# Patient Record
Sex: MALE | Race: ASIAN | Employment: FULL TIME | URBAN - METROPOLITAN AREA
[De-identification: names, ages, dates, MRNs, and addresses within clinical notes are randomized per-mention and may not be internally consistent; named-entity substitution may affect disease eponyms.]

---

## 2020-11-10 ENCOUNTER — OFFICE VISIT (OUTPATIENT)
Dept: FAMILY MEDICINE | Facility: CLINIC | Age: 20
End: 2020-11-10
Payer: COMMERCIAL

## 2020-11-10 VITALS
TEMPERATURE: 97.9 F | RESPIRATION RATE: 16 BRPM | OXYGEN SATURATION: 100 % | HEART RATE: 69 BPM | BODY MASS INDEX: 19.01 KG/M2 | WEIGHT: 125 LBS

## 2020-11-10 DIAGNOSIS — Z20.822 SUSPECTED COVID-19 VIRUS INFECTION: Primary | ICD-10-CM

## 2020-11-10 DIAGNOSIS — G43.009 MIGRAINE WITHOUT AURA AND WITHOUT STATUS MIGRAINOSUS, NOT INTRACTABLE: ICD-10-CM

## 2020-11-10 LAB
COVID-19 VIRUS PCR TO U OF MN - SOURCE: NORMAL
SARS-COV-2 RNA SPEC QL NAA+PROBE: NOT DETECTED

## 2020-11-10 PROCEDURE — 87635 SARS-COV-2 COVID-19 AMP PRB: CPT | Performed by: STUDENT IN AN ORGANIZED HEALTH CARE EDUCATION/TRAINING PROGRAM

## 2020-11-10 PROCEDURE — 99202 OFFICE O/P NEW SF 15 MIN: CPT | Mod: GC | Performed by: STUDENT IN AN ORGANIZED HEALTH CARE EDUCATION/TRAINING PROGRAM

## 2020-11-10 NOTE — PROGRESS NOTES
Headache and COVID-testing         SUBJECTIVE       Isaac Vázquez is a 20-year-old male with past medical history significant for migraines who presents for COVID testing.     Headache and COVID testing:  - Developed symptoms eight days ago   - Right-sided headache with associated nausea   - Typical of migraines in past- has one every 1-2 months.   - Used Tylenol and ibuprofen to control pain. Has never tried Imitrex or migraine-specific medications   - Symptoms resolved five days ago   - Currently asymptomatic   - Denies fever, chills, sweats, runny nose, stuffy nose, cough - non-productive, cough - productive, wheezing, shortness of breath, ear pain, sore throat, hoarse voice, facial pain/pressure, headache, body aches, nausea, vomiting and diarrhea.   - Patient is not considered high risk based on medical history.   - Patient denies any travel, denies exposure to persons with known travel, and denies exposure to patients with known COVID19.   - Requires negative results to return to work.     Establish Care:  - Last seen in 2016 for [facility]   - PMH: Migraines as noted above.   - Medications: No daily medications.   - Social: Employed at [employer].         REVIEW OF SYSTEMS     A 10 point review of systems including constitutional, cardiovascular, respiratory, HEENT, GI, , neurological, MSK, skin, endocrine, is negative unless stated in HPI        OBJECTIVE     Vitals:    11/10/20 0837   Pulse: 69   Resp: 16   Temp: 97.9  F (36.6  C)   TempSrc: Tympanic   SpO2: 100%   Weight: 56.7 kg (125 lb)     Constitutional: Awake, alert, cooperative, no apparent distress, and appears stated age. Appears well hydrated  Eyes: Lids and lashes normal, sclera clear, conjunctiva normal.  ENT: Normocephalic, without obvious abnormality, atramatic  Lungs: No increased work of breathing on room air  Neurologic: Awake, alert, oriented to name, place and time.   Skin: No rash on exposed skin.     No results found for this or any previous  visit (from the past 24 hour(s)).    ASSESSMENT AND PLAN     Migraines: Patient has history of migraine therapy not on any medications. Headaches are occasional currently and only occur every 1-2 months, but can last for days. Discussed with patient that if headaches are becoming more frequent or significantly impacting his ability to function/work, he can return to discuss migraine medications. He is agreeable with the plan.     Suspected COVID-19 virus infection  Patient deemed to be safe to continue supportive cares at home and self-isolation. Patient must isolate until test results return.  Reviewed return precautions. Patient provided with the educational handout. Encouraged them to limit contact with others, wearing a simple mask to cover their cough, practice good hand hygiene habits and accessing our virtual services where possible to limit the spread of this virus.  -     COVID-19 Virus PCR F ([facility])    I discussed the patient with Estrellita Ponce MD who is in agreement with the assessment and plan.      Cris Barrett, MS3    Resident/Fellow Attestation   I, Suhail Velasquez, was present with the medical student who participated in the service and in the documentation of the note.  I have verified the history and personally performed the physical exam and medical decision making.  I agree with the assessment and plan of care as documented in the note.      Suhail Velasquez MD  PGY3    Precepted with Dr. Ponce

## 2020-11-11 PROBLEM — G43.009 MIGRAINE WITHOUT AURA AND WITHOUT STATUS MIGRAINOSUS, NOT INTRACTABLE: Status: ACTIVE | Noted: 2020-11-11

## 2020-11-11 RX ORDER — INFLUENZA A VIRUS A/GUANGDONG-MAONAN/SWL1536/2019 CNIC-1909 (H1N1) ANTIGEN (FORMALDEHYDE INACTIVATED), INFLUENZA A VIRUS A/HONG KONG/2671/2019 (H3N2) ANTIGEN (FORMALDEHYDE INACTIVATED), INFLUENZA B VIRUS B/PHUKET/3073/2013 ANTIGEN (FORMALDEHYDE INACTIVATED), AND INFLUENZA B VIRUS B/WASHINGTON/02/2019 ANTIGEN (FORMALDEHYDE INACTIVATED) 15; 15; 15; 15 UG/.5ML; UG/.5ML; UG/.5ML; UG/.5ML
INJECTION, SUSPENSION INTRAMUSCULAR
COMMUNITY
Start: 2020-10-16

## 2020-11-17 NOTE — PROGRESS NOTES
Preceptor Attestation:   Patient seen, evaluated and discussed with the resident. I have verified the content of the note, which accurately reflects my assessment of the patient and the plan of care.  Supervising Physician:Estrellita Ponce MD  [facility]

## 2025-06-11 ENCOUNTER — HOSPITAL ENCOUNTER (EMERGENCY)
Facility: HOSPITAL | Age: 25
Discharge: HOME | End: 2025-06-11
Attending: STUDENT IN AN ORGANIZED HEALTH CARE EDUCATION/TRAINING PROGRAM | Admitting: STUDENT IN AN ORGANIZED HEALTH CARE EDUCATION/TRAINING PROGRAM
Payer: COMMERCIAL

## 2025-06-11 VITALS
WEIGHT: 135.3 LBS | BODY MASS INDEX: 20.04 KG/M2 | RESPIRATION RATE: 20 BRPM | HEIGHT: 69 IN | SYSTOLIC BLOOD PRESSURE: 95 MMHG | OXYGEN SATURATION: 97 % | HEART RATE: 83 BPM | DIASTOLIC BLOOD PRESSURE: 55 MMHG | TEMPERATURE: 98.2 F

## 2025-06-11 DIAGNOSIS — S01.81XA CHIN LACERATION, INITIAL ENCOUNTER: ICD-10-CM

## 2025-06-11 PROCEDURE — 90471 IMMUNIZATION ADMIN: CPT | Performed by: STUDENT IN AN ORGANIZED HEALTH CARE EDUCATION/TRAINING PROGRAM

## 2025-06-11 PROCEDURE — 250N000011 HC RX IP 250 OP 636: Performed by: STUDENT IN AN ORGANIZED HEALTH CARE EDUCATION/TRAINING PROGRAM

## 2025-06-11 PROCEDURE — 12052 INTMD RPR FACE/MM 2.6-5.0 CM: CPT

## 2025-06-11 PROCEDURE — 90715 TDAP VACCINE 7 YRS/> IM: CPT | Performed by: STUDENT IN AN ORGANIZED HEALTH CARE EDUCATION/TRAINING PROGRAM

## 2025-06-11 PROCEDURE — 99283 EMERGENCY DEPT VISIT LOW MDM: CPT | Mod: 25

## 2025-06-11 PROCEDURE — 250N000013 HC RX MED GY IP 250 OP 250 PS 637: Performed by: STUDENT IN AN ORGANIZED HEALTH CARE EDUCATION/TRAINING PROGRAM

## 2025-06-11 RX ORDER — ACETAMINOPHEN 325 MG/1
975 TABLET ORAL ONCE
Status: COMPLETED | OUTPATIENT
Start: 2025-06-11 | End: 2025-06-11

## 2025-06-11 RX ORDER — IBUPROFEN 600 MG/1
600 TABLET, FILM COATED ORAL ONCE
Status: COMPLETED | OUTPATIENT
Start: 2025-06-11 | End: 2025-06-11

## 2025-06-11 RX ADMIN — IBUPROFEN 600 MG: 600 TABLET ORAL at 02:27

## 2025-06-11 RX ADMIN — CLOSTRIDIUM TETANI TOXOID ANTIGEN (FORMALDEHYDE INACTIVATED), CORYNEBACTERIUM DIPHTHERIAE TOXOID ANTIGEN (FORMALDEHYDE INACTIVATED), BORDETELLA PERTUSSIS TOXOID ANTIGEN (GLUTARALDEHYDE INACTIVATED), BORDETELLA PERTUSSIS FILAMENTOUS HEMAGGLUTININ ANTIGEN (FORMALDEHYDE INACTIVATED), BORDETELLA PERTUSSIS PERTACTIN ANTIGEN, AND BORDETELLA PERTUSSIS FIMBRIAE 2/3 ANTIGEN 0.5 ML: 5; 2; 2.5; 5; 3; 5 INJECTION, SUSPENSION INTRAMUSCULAR at 02:30

## 2025-06-11 RX ADMIN — ACETAMINOPHEN 975 MG: 325 TABLET ORAL at 02:27

## 2025-06-11 ASSESSMENT — ACTIVITIES OF DAILY LIVING (ADL): ADLS_ACUITY_SCORE: 41

## 2025-06-11 NOTE — DISCHARGE INSTRUCTIONS
If you are unable to schedule an appointment with your primary care doctor listed above in 5 days to have your sutures removed, you may go to any urgent care or ER to have your sutures removed in 5 days.    Keep the area clean, and return to the emergency department at once if you develop fever, pus coming out of the wound, if the stitches rip open, or if you are developing redness spreading away from the chin.
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
## 2025-06-11 NOTE — Clinical Note
Isaac Vázquez was seen and treated in our emergency department on 6/11/2025.  He may return to work on 06/12/2025.       If you have any questions or concerns, please don't hesitate to call.      Dick Cardona MD

## 2025-06-21 ENCOUNTER — HEALTH MAINTENANCE LETTER (OUTPATIENT)
Age: 25
End: 2025-06-21